# Patient Record
Sex: FEMALE | Race: BLACK OR AFRICAN AMERICAN | NOT HISPANIC OR LATINO | URBAN - METROPOLITAN AREA
[De-identification: names, ages, dates, MRNs, and addresses within clinical notes are randomized per-mention and may not be internally consistent; named-entity substitution may affect disease eponyms.]

---

## 2017-09-02 ENCOUNTER — EMERGENCY (EMERGENCY)
Facility: HOSPITAL | Age: 27
LOS: 1 days | Discharge: HOME | End: 2017-09-02
Attending: EMERGENCY MEDICINE | Admitting: EMERGENCY MEDICINE
Payer: OTHER MISCELLANEOUS

## 2017-09-02 VITALS
TEMPERATURE: 98 F | RESPIRATION RATE: 16 BRPM | OXYGEN SATURATION: 98 % | HEART RATE: 74 BPM | HEIGHT: 63 IN | DIASTOLIC BLOOD PRESSURE: 83 MMHG | WEIGHT: 126.1 LBS | SYSTOLIC BLOOD PRESSURE: 123 MMHG
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
## 2017-09-02 DIAGNOSIS — S61.102A UNSPECIFIED OPEN WOUND OF LEFT THUMB WITH DAMAGE TO NAIL, INITIAL ENCOUNTER: ICD-10-CM

## 2017-09-02 DIAGNOSIS — S62.522A DISPLACED FRACTURE OF DISTAL PHALANX OF LEFT THUMB, INITIAL ENCOUNTER FOR CLOSED FRACTURE: ICD-10-CM
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
## 2017-09-02 DIAGNOSIS — S69.92XA UNSPECIFIED INJURY OF LEFT WRIST, HAND AND FINGER(S), INITIAL ENCOUNTER: ICD-10-CM
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
## 2017-09-02 PROCEDURE — 99284 EMERGENCY DEPT VISIT MOD MDM: CPT | Mod: 25

## 2017-09-02 PROCEDURE — 73140 X-RAY EXAM OF FINGER(S): CPT | Mod: 26,LT

## 2017-09-02 PROCEDURE — 99053 MED SERV 10PM-8AM 24 HR FAC: CPT

## 2017-09-02 PROCEDURE — 26750 TREAT FINGER FRACTURE EACH: CPT | Mod: 54

## 2017-09-02 NOTE — ED PROVIDER NOTE - PHYSICAL EXAMINATION
GEN: Well appearing, well nourished, awake, alert, oriented to person, place, time/situation and in no apparent distress.  ENT: Airway patent, Nasal mucosa clear. Mouth with normal mucosa.  EYES: Clear bilaterally.  RESPIRATORY: Breathing comfortably with normal RR.  MSK: L thumb - avulasion to radial half of nail plate with exposure/avulsion of nail bed.  Mild bleeding.  +BCR.  +flex/ext intact over 1st IP joint.    NEURO: Alert and oriented, no focal deficits.  SKIN: Skin normal color for race, warm, dry and intact. No evidence of rash.  See MSK exam.   PSYCH: Alert and oriented to person, place, time/situation. normal mood and affect. no apparent risk to self or others.

## 2017-09-02 NOTE — ED ADULT TRIAGE NOTE - CHIEF COMPLAINT QUOTE
patient brought in by ambulance from work, due to thumb injury L hand after got caught in closing door

## 2017-09-02 NOTE — ED PROVIDER NOTE - OBJECTIVE STATEMENT
Pt is a 28yo F with no PMH who presents with injury to L thumb after accidentally slamming it in a car door.  Reports nail broke off and it is bleeding.  Initially it did not hurt but now is throbbing and pain is radiating up hand/arm.  Pain worse with any palpation.  Reports movement of thumb joints are maintained.  Denies any distal numbness.  Denies tobacco use.  Denies other injuries.  Reports tetanus is up to date.

## 2017-09-02 NOTE — ED PROVIDER NOTE - MEDICAL DECISION MAKING DETAILS
Fingernail avulsion with underlying distal tuft fracture.  No nerve or tendon injury.  Wound irrigated thoroughly and dressed with xeroform and bacitracin.  Ring block performed for pain control.  Splinted.  Gave strict instructions to f/u with a hand specialist and given referral to Dr. Rishi Levi.  She understands that this type of injury may result in permanent deformity of her nail and it is imperative to f/u with a hand specialist.

## 2017-09-02 NOTE — ED PROVIDER NOTE - DIAGNOSTIC INTERPRETATION
Xray of L fingers - +distal tuft fracture of 1st distal phalanx.  No dislocation.  +soft tissue deformity to distal 1st digit.

## 2021-12-21 ENCOUNTER — EMERGENCY (EMERGENCY)
Facility: HOSPITAL | Age: 31
LOS: 1 days | Discharge: HOME | End: 2021-12-21
Admitting: EMERGENCY MEDICINE
Payer: COMMERCIAL

## 2021-12-21 VITALS
HEART RATE: 88 BPM | HEIGHT: 63 IN | OXYGEN SATURATION: 96 % | TEMPERATURE: 98 F | RESPIRATION RATE: 18 BRPM | SYSTOLIC BLOOD PRESSURE: 132 MMHG | DIASTOLIC BLOOD PRESSURE: 77 MMHG

## 2021-12-21 DIAGNOSIS — Z20.822 CONTACT WITH AND (SUSPECTED) EXPOSURE TO COVID-19: ICD-10-CM

## 2021-12-21 PROCEDURE — 99053 MED SERV 10PM-8AM 24 HR FAC: CPT

## 2021-12-21 PROCEDURE — 99282 EMERGENCY DEPT VISIT SF MDM: CPT

## 2021-12-21 NOTE — ED PROVIDER NOTE - NS ED ROS FT
GEN - no fever, chills, malaise, weight loss   Skin - no rash, lesions, itch  HEENT - no rhinorrhea, congestion, change in vision/hearing/smell, sore throat, change in voice, neck pain  CV - no chest pain, palpitations, syncope  Resp - no cough, SOB, ALBA, wheezing, hemoptysis  GI - no N/V/D/C  MSK - no swelling, joint pain, swelling, myalgia  Hem - No easy bruising or bleeding  Neuro - no HA, dizziness, loss of smell/taste, focal weakness, paresthesia  Psych - no insomonia or mood changes

## 2021-12-21 NOTE — ED PROVIDER NOTE - PATIENT PORTAL LINK FT
You can access the FollowMyHealth Patient Portal offered by [facility] by registering at the following website: [email or web address]. By joining [facility]’s FollowMyHealth portal, you will also be able to view your health information using other applications (apps) compatible with our system.

## 2021-12-21 NOTE — ED PROVIDER NOTE - OBJECTIVE STATEMENT
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  Reports no active sx or contact with COVID+ personnels.  Denies fever, chills, cough, SOB, palpitations, HA, dizziness, congestion, rhinorrhea, N/V/D/C, abdomina pain, change in urinary/bowel function, focal weakness, and malaise. No recent travel noted

## 2021-12-25 ENCOUNTER — EMERGENCY (EMERGENCY)
Facility: HOSPITAL | Age: 31
LOS: 1 days | Discharge: HOME | End: 2021-12-25
Admitting: EMERGENCY MEDICINE
Payer: COMMERCIAL

## 2021-12-25 VITALS
TEMPERATURE: 98 F | RESPIRATION RATE: 18 BRPM | OXYGEN SATURATION: 99 % | DIASTOLIC BLOOD PRESSURE: 78 MMHG | HEIGHT: 63 IN | SYSTOLIC BLOOD PRESSURE: 112 MMHG | HEART RATE: 84 BPM

## 2021-12-25 DIAGNOSIS — J02.9 ACUTE PHARYNGITIS, UNSPECIFIED: ICD-10-CM

## 2021-12-25 DIAGNOSIS — R05.9 COUGH, UNSPECIFIED: ICD-10-CM

## 2021-12-25 DIAGNOSIS — Z20.822 CONTACT WITH AND (SUSPECTED) EXPOSURE TO COVID-19: ICD-10-CM

## 2021-12-25 PROCEDURE — 99053 MED SERV 10PM-8AM 24 HR FAC: CPT

## 2021-12-25 PROCEDURE — 99284 EMERGENCY DEPT VISIT MOD MDM: CPT

## 2021-12-25 NOTE — ED PROVIDER NOTE - PATIENT PORTAL LINK FT
You can access the FollowMyHealth Patient Portal offered by [facility] by registering at the following website: [email or web address]. By joining [facility]’s FollowMyHealth portal, you will also be able to view your health information using other applications (apps) compatible with our system.

## 2021-12-25 NOTE — ED PROVIDER NOTE - NS ED ROS FT
GEN - no fever, chills, malaise, weight loss   Skin - no rash, lesions, itch  HEENT - no rhinorrhea, congestion, change in vision/hearing/smell, +sore throat, no change in voice, neck pain  CV - no chest pain, palpitations, syncope  Resp - +cough, no SOB, ALBA, wheezing, hemoptysis  GI - no N/V/D/C  MSK - no swelling, joint pain, swelling, myalgia  Hem - No easy bruising or bleeding  Neuro - no HA, dizziness, loss of smell/taste, focal weakness, paresthesia  Psych - no insomonia or mood changes

## 2021-12-25 NOTE — ED PROVIDER NOTE - OBJECTIVE STATEMENT
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  Reports dry cough x 4d with sore throat, and recent exposure to COVID+ personnels.  Denies fever, chills, SOB, palpitations, HA, dizziness, congestion, rhinorrhea, N/V/D/C, abdomina pain, change in urinary/bowel function, focal weakness, and malaise. No recent travel noted

## 2021-12-25 NOTE — ED PROVIDER NOTE - PHYSICAL EXAMINATION
Gen - WDWN, NAD, speaking in full sentences  Skin - no acute rash, intact   HEENT - AT/NC, no conjunctival injection or dc, o/p clear without erythema or exudate, uvula midline, neck supple and FROM, airway patent   CV - S1S2, R/R/R  Resp - CTAB, no focal r/r/w   MSK - w/w/p, full ROM of peripheral joints, no midline tenderness   Psych - euphoria, normal speech and eye contact, judgement/insight intact   Neuro - AxOx3, ambulatory with steady gait

## 2022-05-09 NOTE — ED ADULT NURSE NOTE - CHIEF COMPLAINT
Pre-Op call completed. Medications list reviewed and updated as needed.  Instructed patient to hold all medications am of surgery.      Patient instructed to arrive for surgery at 0800 on 5/16/22 at [facility].    Pre-op instructions reviewed, verbalized understanding.  Questions answered.  Call back number of [phone] given in case other concerns or questions arise.    Patient instructed to bring in current list of medications (name of medication, dose and frequency of daily use) day of surgery.     Pre-op Teaching Completed:    OR - Procedure, OR - Time and time of arrival, Location of Registration, NPO, Medications to take Day of Surgery, Skin Prep, Discharge Policy: Ride/Responsibile Adult and Bowel Prep      Patient instructed to notify surgeon if patient has or develops any fever, cold or flu like symptoms, other signs of general illness, or any exposure to COVID19.  Patient also notified of current hospital visitor restrictions and hospital entrance screening.    PATIENT AWARE/NOTIFIED OF COVID TESTING TO BE COMPLETED PRIOR TO SURGERY.  PATIENT INSTRUCTED TO SELF QUARANTINE FROM TIME OF TESTING UNTIL SURGERY, IF PATIENT IS UNABLE TO QUARANTINE PATIENT MUST CONTINUE TO WEAR A MASK, PRACTICE SOCIAL DISTANCING AND PERFORM GOOD HAND HYGIENE.  Patient also notified if test not completed, surgery will need to be rescheduled.         The patient is a 27y Female complaining of finger pain/injury.

## 2023-09-14 ENCOUNTER — EMERGENCY (EMERGENCY)
Facility: HOSPITAL | Age: 33
LOS: 1 days | Discharge: HOME | End: 2023-09-14
Admitting: EMERGENCY MEDICINE
Payer: OTHER MISCELLANEOUS

## 2023-09-14 VITALS
HEIGHT: 62 IN | TEMPERATURE: 98 F | DIASTOLIC BLOOD PRESSURE: 81 MMHG | RESPIRATION RATE: 15 BRPM | OXYGEN SATURATION: 98 % | WEIGHT: 162.04 LBS | HEART RATE: 69 BPM | SYSTOLIC BLOOD PRESSURE: 120 MMHG

## 2023-09-14 DIAGNOSIS — S70.01XA CONTUSION OF RIGHT HIP, INITIAL ENCOUNTER: ICD-10-CM

## 2023-09-14 DIAGNOSIS — S09.90XA UNSPECIFIED INJURY OF HEAD, INITIAL ENCOUNTER: ICD-10-CM
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
## 2023-09-14 DIAGNOSIS — M25.551 PAIN IN RIGHT HIP: ICD-10-CM

## 2023-09-14 DIAGNOSIS — M25.561 PAIN IN RIGHT KNEE: ICD-10-CM

## 2023-09-14 LAB — HCG UR QL: NEGATIVE — SIGNIFICANT CHANGE UP

## 2023-09-14 PROCEDURE — 73502 X-RAY EXAM HIP UNI 2-3 VIEWS: CPT | Mod: 26,RT

## 2023-09-14 PROCEDURE — 73590 X-RAY EXAM OF LOWER LEG: CPT | Mod: 26,RT

## 2023-09-14 PROCEDURE — 73562 X-RAY EXAM OF KNEE 3: CPT | Mod: 26,RT

## 2023-09-14 PROCEDURE — 99053 MED SERV 10PM-8AM 24 HR FAC: CPT

## 2023-09-14 PROCEDURE — 99284 EMERGENCY DEPT VISIT MOD MDM: CPT

## 2023-09-14 RX ORDER — IBUPROFEN 200 MG
1 TABLET ORAL
Qty: 56 | Refills: 0
Start: 2023-09-14 | End: 2023-09-27
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
## 2023-09-14 NOTE — ED ADULT NURSE NOTE - NSFALLUNIVINTERV_ED_ALL_ED
Bed/Stretcher in lowest position, wheels locked, appropriate side rails in place/Call bell, personal items and telephone in reach/Instruct patient to call for assistance before getting out of bed/chair/stretcher/Non-slip footwear applied when patient is off stretcher/[place] to call system/Physically safe environment - no spills, clutter or unnecessary equipment/Purposeful proactive rounding/Room/bathroom lighting operational, light cord in reach

## 2023-09-14 NOTE — ED PROVIDER NOTE - PATIENT PORTAL LINK FT
You can access the FollowMyHealth Patient Portal offered by [facility] by registering at the following website: [email or web address]. By joining [employer]’s FollowMyHealth portal, you will also be able to view your health information using other applications (apps) compatible with our system.

## 2023-09-14 NOTE — ED PROVIDER NOTE - PHYSICAL EXAMINATION
Physical Exam    Vital Signs: I have reviewed the initial vital signs.  Constitutional: well-appearing, appears stated age  Head: +ttp over the R perietal scalp, no hematoma, no laceration  Cspine: no c spine ttp, full neck ROM, flexion and rotation  Cardiovascular: regular rate, regular rhythm, well-perfused extremities, radial pulse +2 and equal b/l  Respiratory: unlabored respiratory effort  Musculoskeletal: +R hip ttp, able to range the R hip fully and bare weight, no deformity, +ttp over the R knee and R proximal tib fib, able to fully range the R knee  Integumentary: warm, dry, no rash  Neurologic: awake, alert, cranial nerves II-XII grossly intact, extremities’ motor and sensory functions grossly intact
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
## 2024-08-15 NOTE — ED PROVIDER NOTE - CARE PLAN
[FreeTextEntry1] : MRI/ MRA with contrast - results reviewed and discussed with the patient  Principal Discharge DX:	Thumb injury, left, initial encounter
